# Patient Record
Sex: FEMALE | Race: BLACK OR AFRICAN AMERICAN | NOT HISPANIC OR LATINO | Employment: FULL TIME | URBAN - METROPOLITAN AREA
[De-identification: names, ages, dates, MRNs, and addresses within clinical notes are randomized per-mention and may not be internally consistent; named-entity substitution may affect disease eponyms.]

---

## 2023-11-20 ENCOUNTER — OFFICE VISIT (OUTPATIENT)
Dept: OBSTETRICS AND GYNECOLOGY | Facility: CLINIC | Age: 33
End: 2023-11-20
Payer: COMMERCIAL

## 2023-11-20 VITALS — WEIGHT: 135.81 LBS | SYSTOLIC BLOOD PRESSURE: 108 MMHG | HEART RATE: 108 BPM | DIASTOLIC BLOOD PRESSURE: 71 MMHG

## 2023-11-20 DIAGNOSIS — Z30.9 ENCOUNTER FOR CONTRACEPTIVE MANAGEMENT, UNSPECIFIED TYPE: ICD-10-CM

## 2023-11-20 DIAGNOSIS — Z01.419 WELL WOMAN EXAM WITH ROUTINE GYNECOLOGICAL EXAM: Primary | ICD-10-CM

## 2023-11-20 DIAGNOSIS — N89.8 VAGINAL LEUKORRHEA: ICD-10-CM

## 2023-11-20 PROCEDURE — 99385 PREV VISIT NEW AGE 18-39: CPT | Mod: S$GLB,,,

## 2023-11-20 PROCEDURE — 1159F MED LIST DOCD IN RCRD: CPT | Mod: CPTII,S$GLB,,

## 2023-11-20 PROCEDURE — 3074F SYST BP LT 130 MM HG: CPT | Mod: CPTII,S$GLB,,

## 2023-11-20 PROCEDURE — 99385 PR PREVENTIVE VISIT,NEW,18-39: ICD-10-PCS | Mod: S$GLB,,,

## 2023-11-20 PROCEDURE — 3074F PR MOST RECENT SYSTOLIC BLOOD PRESSURE < 130 MM HG: ICD-10-PCS | Mod: CPTII,S$GLB,,

## 2023-11-20 PROCEDURE — 3078F DIAST BP <80 MM HG: CPT | Mod: CPTII,S$GLB,,

## 2023-11-20 PROCEDURE — 3078F PR MOST RECENT DIASTOLIC BLOOD PRESSURE < 80 MM HG: ICD-10-PCS | Mod: CPTII,S$GLB,,

## 2023-11-20 PROCEDURE — 1159F PR MEDICATION LIST DOCUMENTED IN MEDICAL RECORD: ICD-10-PCS | Mod: CPTII,S$GLB,,

## 2023-11-20 RX ORDER — SPIRONOLACTONE 100 MG/1
TABLET, FILM COATED ORAL
COMMUNITY
Start: 2019-11-04

## 2023-11-20 RX ORDER — ONDANSETRON 8 MG/1
TABLET, ORALLY DISINTEGRATING ORAL
COMMUNITY
Start: 2023-11-14

## 2023-11-20 RX ORDER — METFORMIN HYDROCHLORIDE 1000 MG/1
TABLET ORAL
COMMUNITY
Start: 2019-11-04

## 2023-11-20 RX ORDER — HYDRALAZINE HYDROCHLORIDE 10 MG/1
TABLET, FILM COATED ORAL
COMMUNITY
Start: 2022-11-07

## 2023-11-20 RX ORDER — LABETALOL 200 MG/1
TABLET, FILM COATED ORAL
COMMUNITY
Start: 2017-08-30

## 2023-11-20 RX ORDER — ALPRAZOLAM 1 MG/1
TABLET ORAL
COMMUNITY
Start: 2019-08-21

## 2023-11-20 RX ORDER — DEXTROAMPHETAMINE SACCHARATE, AMPHETAMINE ASPARTATE, DEXTROAMPHETAMINE SULFATE, AND AMPHETAMINE SULFATE 3.75; 3.75; 3.75; 3.75 MG/1; MG/1; MG/1; MG/1
TABLET ORAL
COMMUNITY
Start: 2023-10-02

## 2023-11-20 RX ORDER — LEVOTHYROXINE, LIOTHYRONINE 38; 9 UG/1; UG/1
TABLET ORAL
COMMUNITY
Start: 2021-07-19

## 2023-11-20 RX ORDER — ETONOGESTREL AND ETHINYL ESTRADIOL VAGINAL RING .015; .12 MG/D; MG/D
1 RING VAGINAL
Qty: 1 EACH | Refills: 11 | Status: SHIPPED | OUTPATIENT
Start: 2023-11-20 | End: 2024-11-19

## 2023-11-20 RX ORDER — NIFEDIPINE 60 MG
TABLET, EXTENDED RELEASE 24 HR ORAL
COMMUNITY
Start: 2017-08-30

## 2023-11-20 RX ORDER — ZOLPIDEM TARTRATE 12.5 MG/1
TABLET, FILM COATED, EXTENDED RELEASE ORAL
COMMUNITY
Start: 2023-10-24

## 2023-11-20 RX ORDER — LAMOTRIGINE 100 MG/1
TABLET, EXTENDED RELEASE ORAL
COMMUNITY
Start: 2023-10-03

## 2023-11-20 RX ORDER — HYDROCHLOROTHIAZIDE 12.5 MG/1
TABLET ORAL
COMMUNITY
Start: 2019-11-04

## 2023-11-20 RX ORDER — TIRZEPATIDE 10 MG/.5ML
INJECTION, SOLUTION SUBCUTANEOUS
COMMUNITY
Start: 2023-11-14

## 2023-11-20 RX ORDER — HYDROXYZINE HYDROCHLORIDE 10 MG/1
TABLET, FILM COATED ORAL
COMMUNITY
Start: 2023-09-21

## 2023-11-20 RX ORDER — LURASIDONE HYDROCHLORIDE 80 MG/1
80 TABLET, FILM COATED ORAL
COMMUNITY
Start: 2023-11-14

## 2023-11-20 NOTE — PROGRESS NOTES
Subjective:      Patient ID: Amena Aparicio is a 33 y.o. female who presents for evaluation today.    Chief Complaint:    Well Woman and Contraception (Pt would like to discuss getting back on the birth control pill that she was previously on.)      History of Present Illness  Annual Exam (Premenopausal) - Patient presents for annual exam. The patient also has complaints of occasional white vaginal discharge. The patient is sexually active. GYN screening history: last pap: patient does not recall results of last pap. The patient wears seatbelts: yes. The patient participates in regular exercise: not asked. Has the patient ever been transfused or tattooed?: not asked. The patient reports that there is not domestic violence in her life. She has regular periods, they are manageable. She is using withdrawal methods but would like to get back on OCPs. She tried Mirena IUD but did not tolerate due to irregular bleeding. Not interested in depo, nexplanon, or tubal at this time. She sees Dr. Sawyer for her primary care, Amena Auguste for her psych management. She reports an occasional white, thin discharge. She denies itching or odor. She denies bowel or bladder problems.      GYN History  No LMP recorded (within days).   Date of Last Pap: Pap smear completed today with HPV co-testing    VITALS  /71   Pulse 108   Wt 61.6 kg (135 lb 12.8 oz)   LMP  (Within Days)   Weight: 61.6 kg (135 lb 12.8 oz)         PAST MEDICAL HISTORY  Past Medical History:   Diagnosis Date    Anxiety 2008    Untreated until college years. Long term use of antianxiety meds.    Depression 2004    Untreated until college years    Diabetes mellitus 2021    Detected by weight loss NP    Hypertension 2017    Treated in ICU x 1 week. Been taking several meds since.    Thyroid disease 2021    Detected by weight loss NP       PAST SURGICAL HISTORY  Past Surgical History:   Procedure Laterality Date    CERVICAL BIOPSY  W/ LOOP ELECTRODE EXCISION        SECTION  , 2017    DILATION AND CURETTAGE OF UTERUS         SOCIAL HISTORY  Social History     Tobacco Use   Smoking Status Never   Smokeless Tobacco Never   ,   Social History     Substance and Sexual Activity   Alcohol Use Yes    Comment: Rare. Socially.        MEDICATIONS  Outpatient Medications Marked as Taking for the 23 encounter (Office Visit) with Aylin Taylor NP   Medication Sig Dispense Refill    ADDERALL 15 mg tablet       hydrALAZINE (APRESOLINE) 10 MG tablet       hydroCHLOROthiazide (HYDRODIURIL) 12.5 MG Tab       hydrOXYzine HCL (ATARAX) 10 MG Tab       labetaloL (NORMODYNE) 200 MG tablet       lamotrigine XR (LAMICTAL XR) 100 mg TR24 XR tablet       lurasidone (LATUDA) 80 mg Tab tablet Take 80 mg by mouth.      metFORMIN (GLUCOPHAGE) 1000 MG tablet       MOUNJARO 10 mg/0.5 mL PnIj INJECT 10 MG SUBCUTANSOULY ONCE A WEEK      NP THYROID 60 mg Tab       ondansetron (ZOFRAN-ODT) 8 MG TbDL TAKE 1 TABLET BY MOUTH THREE TIMES A DAY AS NEEDED FOR NAUSEA AND VOMITING      PROCARDIA XL 60 mg 24 hr tablet       spironolactone (ALDACTONE) 100 MG tablet       XANAX 1 mg tablet       zolpidem (AMBIEN CR) 12.5 MG CR tablet            Review of Systems   Review of Systems   Constitutional:  Negative for activity change, chills and fever.   Eyes:  Negative for visual disturbance.   Respiratory:  Negative for shortness of breath.    Cardiovascular:  Negative for chest pain.   Gastrointestinal:  Negative for abdominal pain, constipation, diarrhea, nausea and vomiting.   Genitourinary:  Negative for dysuria, flank pain, frequency, hematuria, menorrhagia, pelvic pain, urgency, vaginal bleeding and vaginal discharge.        No abnormal vaginal bleeding   Musculoskeletal:  Negative for back pain.   Integumentary:  Negative for rash and breast mass.   Neurological:  Negative for numbness and headaches.   Psychiatric/Behavioral:          No mood disturbance or changes    Breast: Negative for  mass          Objective:     Physical Exam:   Constitutional: She is oriented to person, place, and time. She appears well-developed. She is cooperative.    HENT:   Head: Normocephalic.     Neck: Trachea normal. No thyromegaly present.    Cardiovascular:  Normal rate, regular rhythm and normal heart sounds.             Pulmonary/Chest: Effort normal and breath sounds normal. Right breast exhibits no mass, no nipple discharge and no skin change. Left breast exhibits no mass, no nipple discharge and no skin change.        Abdominal: Soft. There is no abdominal tenderness. There is no rebound and no guarding.     Genitourinary:    Vagina and uterus normal.      Pelvic exam was performed with patient supine.   The external female genitalia was normal.   Labial bartholins normal.There is no lesion on the right labia. There is no lesion on the left labia. Cervix is normal. Right adnexum displays no mass and no tenderness. Left adnexum displays no mass and no tenderness. Vaginal discharge: leukorrhea. Cervix exhibits no discharge.    pap smear completedUterus is not enlarged and not tender.              Lymphadenopathy:        Head (right side): No submental and no submandibular adenopathy present.        Head (left side): No submental and no submandibular adenopathy present.     She has no cervical adenopathy.    Neurological: She is alert and oriented to person, place, and time.    Skin: Skin is warm.    Psychiatric: She has a normal mood and affect. Her speech is normal and behavior is normal. Thought content normal.          Assessment:        1. Well woman exam with routine gynecological exam    2. Vaginal leukorrhea    3. Encounter for contraceptive management, unspecified type       Well woman exam with routine gynecological exam  -     Liquid-based pap smear, screening    Vaginal leukorrhea  -     Aerobic culture    Encounter for contraceptive management, unspecified type  -     POCT urine pregnancy  -      etonogestreL-ethinyl estradioL (NUVARING) 0.12-0.015 mg/24 hr vaginal ring; Place 1 each vaginally every 21 days. Insert one (1) ring vaginally and leave in place for three (3) weeks, then remove for one (1) week.  Dispense: 1 each; Refill: 11         Plan:     Patient instructed to contact the clinic should any questions or concerns arise prior to her next office visit. Patient is happy with the plan of care at this time, verbalizes understanding and denies outstanding questions.      Pap  Self-breast exams  Birth Control options & back up protection discussed  Discussed potential for decreased effectiveness of OCPs while on mounjaro  She is interested in trying Nuvaring   Discussed HTN risk with estrogen containing products, as progesterone methods or BTL are safer  Discussed monitoring blood pressure at home, notify if elevated to consider alternative methods    Boric acid suppositories 600mg twice weekly for pH balance   If you don't hear from the office regarding results within 1 week, please call  Follow up in 1 year for annual or sooner as needed  Chaperone present for exam    Patient was counseled today on vaginitis prevention including:   a. avoiding feminine products such as deodorant soaps, body wash, bubble bath, douches, scented toilet paper, deodorant tampons or pads, feminine wipes, chronic pad use, etc.   b. avoiding other vulvovaginal irritants such as long hot baths, humidity, tight, synthetic clothing, chlorine and prolonged sitting in wet bathing suits   c. wearing cotton underwear, avoiding thong underwear and no underwear to bed   d. taking showers instead of baths and use a hair dryer on cool setting afterwards to dry   e. wearing cotton to exercise, showering immediately after exercise and changing clothes   f. using polyurethane condoms without spermicide if sexually active and symptoms are triggered by intercourse

## 2023-11-22 LAB
CULTURE: NORMAL
Lab: NORMAL

## 2024-05-10 DIAGNOSIS — Z30.9 ENCOUNTER FOR CONTRACEPTIVE MANAGEMENT, UNSPECIFIED TYPE: ICD-10-CM

## 2024-05-10 RX ORDER — ETONOGESTREL AND ETHINYL ESTRADIOL VAGINAL RING .015; .12 MG/D; MG/D
1 RING VAGINAL
Qty: 3 EACH | Refills: 2 | Status: SHIPPED | OUTPATIENT
Start: 2024-05-10